# Patient Record
Sex: FEMALE | Race: WHITE | NOT HISPANIC OR LATINO
[De-identification: names, ages, dates, MRNs, and addresses within clinical notes are randomized per-mention and may not be internally consistent; named-entity substitution may affect disease eponyms.]

---

## 2020-12-01 PROBLEM — Z86.19 HISTORY OF HERPES SIMPLEX INFECTION: Status: RESOLVED | Noted: 2020-12-01 | Resolved: 2020-12-01

## 2020-12-01 PROBLEM — Z80.0 FAMILY HISTORY OF MALIGNANT NEOPLASM OF COLON: Status: ACTIVE | Noted: 2020-12-01

## 2020-12-01 PROBLEM — Z78.9 DOES NOT USE TOBACCO: Status: ACTIVE | Noted: 2020-12-01

## 2020-12-01 PROBLEM — Z87.59 HISTORY OF POSTPARTUM DEPRESSION: Status: RESOLVED | Noted: 2020-12-01 | Resolved: 2020-12-01

## 2020-12-01 PROBLEM — Z87.898 HISTORY OF MARIJUANA USE: Status: ACTIVE | Noted: 2020-12-01

## 2020-12-01 PROBLEM — Z63.5 DIVORCED: Status: ACTIVE | Noted: 2020-12-01

## 2020-12-01 RX ORDER — LAMOTRIGINE 25 MG/1
TABLET ORAL
Refills: 0 | Status: ACTIVE | COMMUNITY

## 2020-12-01 RX ORDER — VALACYCLOVIR HYDROCHLORIDE 1 G/1
TABLET, FILM COATED ORAL
Refills: 0 | Status: ACTIVE | COMMUNITY

## 2020-12-04 ENCOUNTER — APPOINTMENT (OUTPATIENT)
Dept: GYNECOLOGIC ONCOLOGY | Facility: CLINIC | Age: 45
End: 2020-12-04
Payer: COMMERCIAL

## 2020-12-04 VITALS
BODY MASS INDEX: 24.75 KG/M2 | RESPIRATION RATE: 16 BRPM | SYSTOLIC BLOOD PRESSURE: 101 MMHG | DIASTOLIC BLOOD PRESSURE: 67 MMHG | WEIGHT: 145 LBS | HEIGHT: 64 IN | OXYGEN SATURATION: 97 % | HEART RATE: 63 BPM

## 2020-12-04 DIAGNOSIS — Z87.59 PERSONAL HISTORY OF OTHER COMPLICATIONS OF PREGNANCY, CHILDBIRTH AND THE PUERPERIUM: ICD-10-CM

## 2020-12-04 DIAGNOSIS — Z80.0 FAMILY HISTORY OF MALIGNANT NEOPLASM OF DIGESTIVE ORGANS: ICD-10-CM

## 2020-12-04 DIAGNOSIS — Z78.9 OTHER SPECIFIED HEALTH STATUS: ICD-10-CM

## 2020-12-04 DIAGNOSIS — Z86.59 PERSONAL HISTORY OF OTHER COMPLICATIONS OF PREGNANCY, CHILDBIRTH AND THE PUERPERIUM: ICD-10-CM

## 2020-12-04 DIAGNOSIS — Z87.898 PERSONAL HISTORY OF OTHER SPECIFIED CONDITIONS: ICD-10-CM

## 2020-12-04 DIAGNOSIS — Z86.19 PERSONAL HISTORY OF OTHER INFECTIOUS AND PARASITIC DISEASES: ICD-10-CM

## 2020-12-04 DIAGNOSIS — Z63.5 DISRUPTION OF FAMILY BY SEPARATION AND DIVORCE: ICD-10-CM

## 2020-12-04 DIAGNOSIS — N89.8 OTHER SPECIFIED NONINFLAMMATORY DISORDERS OF VAGINA: ICD-10-CM

## 2020-12-04 PROCEDURE — 99072 ADDL SUPL MATRL&STAF TM PHE: CPT

## 2020-12-04 PROCEDURE — 99203 OFFICE O/P NEW LOW 30 MIN: CPT

## 2020-12-04 SDOH — SOCIAL STABILITY - SOCIAL INSECURITY: DISRUPTION OF FAMILY BY SEPARATION AND DIVORCE: Z63.5

## 2020-12-08 PROBLEM — N89.8 VAGINAL LESION: Status: ACTIVE | Noted: 2020-12-01

## 2020-12-09 NOTE — END OF VISIT
[FreeTextEntry3] : Written by Jolene Davison PA-C, acting as a scribe for Dr. Chi Osei.\par This note accurately reflects the work and decisions made by me.\par

## 2020-12-09 NOTE — PHYSICAL EXAM
[Normal] : Bimanual Exam: Normal [de-identified] : Patient was seen and examined with chaperone Jolene Davison PA-C.

## 2020-12-09 NOTE — CHIEF COMPLAINT
[FreeTextEntry1] : [facility]\par [place] [facility]\par [address]\[place] [address]\par

## 2020-12-09 NOTE — ASSESSMENT
[FreeTextEntry1] : 45 year old with concern for vaginal lump/lesion. Discussed with patient there is no visualization of an abnormality on her exam today. We discussed that she is likely feeling the posterior lip of her cervix but that everything appears WNL on examination. She will follow up with her primary GYN for continued care.

## 2023-04-24 ENCOUNTER — APPOINTMENT (OUTPATIENT)
Dept: OBGYN | Facility: CLINIC | Age: 48
End: 2023-04-24
Payer: COMMERCIAL

## 2023-04-24 VITALS — WEIGHT: 140 LBS | BODY MASS INDEX: 23.32 KG/M2 | HEIGHT: 65 IN

## 2023-04-24 DIAGNOSIS — Z00.00 ENCOUNTER FOR GENERAL ADULT MEDICAL EXAMINATION W/OUT ABNORMAL FINDINGS: ICD-10-CM

## 2023-04-24 DIAGNOSIS — N39.3 STRESS INCONTINENCE (FEMALE) (MALE): ICD-10-CM

## 2023-04-24 DIAGNOSIS — F32.A DEPRESSION, UNSPECIFIED: ICD-10-CM

## 2023-04-24 DIAGNOSIS — R10.2 PELVIC AND PERINEAL PAIN: ICD-10-CM

## 2023-04-24 DIAGNOSIS — N94.10 UNSPECIFIED DYSPAREUNIA: ICD-10-CM

## 2023-04-24 DIAGNOSIS — N81.4 UTEROVAGINAL PROLAPSE, UNSPECIFIED: ICD-10-CM

## 2023-04-24 DIAGNOSIS — N81.6 RECTOCELE: ICD-10-CM

## 2023-04-24 DIAGNOSIS — Z71.9 COUNSELING, UNSPECIFIED: ICD-10-CM

## 2023-04-24 LAB
HCG UR QL: NEGATIVE
QUALITY CONTROL: YES

## 2023-04-24 PROCEDURE — 99205 OFFICE O/P NEW HI 60 MIN: CPT

## 2023-04-26 PROBLEM — N81.6 RECTOCELE, FEMALE: Status: ACTIVE | Noted: 2023-04-26

## 2023-04-26 PROBLEM — Z71.9 ENCOUNTER FOR CONSULTATION: Status: ACTIVE | Noted: 2023-04-24

## 2023-04-26 PROBLEM — R10.2 PELVIC PAIN IN FEMALE: Status: ACTIVE | Noted: 2023-04-26

## 2023-04-26 PROBLEM — N39.3 STRESS INCONTINENCE OF URINE: Status: ACTIVE | Noted: 2023-04-26

## 2023-04-26 PROBLEM — F32.A DEPRESSION: Status: ACTIVE | Noted: 2023-04-24

## 2023-04-26 PROBLEM — N94.10 DYSPAREUNIA IN FEMALE: Status: ACTIVE | Noted: 2023-04-24

## 2023-04-26 PROBLEM — N81.4 CYSTOCELE WITH PROLAPSE: Status: ACTIVE | Noted: 2023-04-26

## 2023-04-26 NOTE — CONSULT LETTER
[Dear  ___] : Dear  [unfilled], [FreeTextEntry1] : I had the pleasure of evaluating your patient, LAKSHMI CHAVEZ. Please see my note enclosed for full details.\par \par Thank you for allowing me to participate in the care of this patient. If you have any questions, please do not hesitate to contact me.\par \par Sincerely,\par \par Huong Rosen MD, FACOG \par [facility]\par Obstetrics and Gynecology in [place]\par [address] [place] [address]\[place] [address]\par Phone: [phone] Fax: [phone]

## 2023-04-26 NOTE — REASON FOR VISIT
[Initial] : an initial consultation for [FreeTextEntry2] : surgery  [FreeTextEntry1] : Jenaro perez

## 2023-04-26 NOTE — DISCUSSION/SUMMARY
[FreeTextEntry1] : 49 yo with pelvic pain exacerbated by intercourse, dyspareunia, hx fibroids. Pt with stress incontinence and exam notable for cystocele and rectocele. \par 1) Pelvic pain, dyspareunia: \par -Discussed possible causes including post tubal ablative syndrome, fibroids (degeneration), pelvic floor dysfunction, pelvic congestion syndrome, less likely endometriosis. Discussed options for management including NSAID, trial of hormonal options, pelvic floor PT, IR embolization,  and surgical options including diagnostic laparoscopy and hysterectomy. She desires hysterectomy. Based on her exam, she is a candidate for minimally invasive approach. We discussed the risks and benefits of removal vs retention of cervix, removal vs retention of fallopian tubes, and removal vs retention of ovaries. Tentative plan for hysterectomy/bilateral salpingectomy/cystoscopy. \par \par 2) Stress incontinence, cystocele/rectocele: \par -Referral to Urogyn for evaluation\par -Consideration of concomitant surgery to address

## 2023-04-26 NOTE — PHYSICAL EXAM
[Chaperone Present] : A chaperone was present in the examining room during all aspects of the physical examination [FreeTextEntry1] : ABELINO Jose  [Appropriately responsive] : appropriately responsive [Alert] : alert [No Acute Distress] : no acute distress [No Lymphadenopathy] : no lymphadenopathy [Soft] : soft [Non-tender] : non-tender [Non-distended] : non-distended [No HSM] : No HSM [No Lesions] : no lesions [No Mass] : no mass [Oriented x3] : oriented x3 [Labia Majora] : normal [Labia Minora] : normal [Cystocele] : a cystocele [Rectocele] : a rectocele [Uterine Adnexae] : normal [Normal] : normal [FreeTextEntry6] : 10 week globular mobile uterus with palpable left anterior fibroid in lower uterine segment

## 2023-06-06 ENCOUNTER — RESULT CHARGE (OUTPATIENT)
Age: 48
End: 2023-06-06

## 2023-06-06 ENCOUNTER — APPOINTMENT (OUTPATIENT)
Dept: UROGYNECOLOGY | Facility: CLINIC | Age: 48
End: 2023-06-06
Payer: COMMERCIAL

## 2023-06-06 VITALS
WEIGHT: 143 LBS | HEIGHT: 65 IN | BODY MASS INDEX: 23.82 KG/M2 | SYSTOLIC BLOOD PRESSURE: 122 MMHG | DIASTOLIC BLOOD PRESSURE: 82 MMHG

## 2023-06-06 DIAGNOSIS — Z86.39 PERSONAL HISTORY OF OTHER ENDOCRINE, NUTRITIONAL AND METABOLIC DISEASE: ICD-10-CM

## 2023-06-06 DIAGNOSIS — M54.50 LOW BACK PAIN, UNSPECIFIED: ICD-10-CM

## 2023-06-06 DIAGNOSIS — R35.1 NOCTURIA: ICD-10-CM

## 2023-06-06 DIAGNOSIS — R30.0 DYSURIA: ICD-10-CM

## 2023-06-06 DIAGNOSIS — R35.0 FREQUENCY OF MICTURITION: ICD-10-CM

## 2023-06-06 DIAGNOSIS — R39.15 URGENCY OF URINATION: ICD-10-CM

## 2023-06-06 DIAGNOSIS — Z87.828 PERSONAL HISTORY OF OTHER (HEALED) PHYSICAL INJURY AND TRAUMA: ICD-10-CM

## 2023-06-06 DIAGNOSIS — R32 UNSPECIFIED URINARY INCONTINENCE: ICD-10-CM

## 2023-06-06 DIAGNOSIS — Z86.59 PERSONAL HISTORY OF OTHER MENTAL AND BEHAVIORAL DISORDERS: ICD-10-CM

## 2023-06-06 LAB
BILIRUB UR QL STRIP: NEGATIVE
CLARITY UR: CLEAR
COLLECTION METHOD: NORMAL
GLUCOSE UR-MCNC: NEGATIVE
HCG UR QL: 0.2 EU/DL
HGB UR QL STRIP.AUTO: NEGATIVE
KETONES UR-MCNC: NEGATIVE
LEUKOCYTE ESTERASE UR QL STRIP: NEGATIVE
NITRITE UR QL STRIP: NEGATIVE
PH UR STRIP: 6
PROT UR STRIP-MCNC: NEGATIVE
SP GR UR STRIP: 1.02

## 2023-06-06 PROCEDURE — 81003 URINALYSIS AUTO W/O SCOPE: CPT | Mod: QW

## 2023-06-06 PROCEDURE — 51701 INSERT BLADDER CATHETER: CPT | Mod: 59

## 2023-06-06 PROCEDURE — 99214 OFFICE O/P EST MOD 30 MIN: CPT | Mod: 25

## 2023-06-06 PROCEDURE — 99204 OFFICE O/P NEW MOD 45 MIN: CPT | Mod: 25

## 2023-06-06 NOTE — PHYSICAL EXAM
[Chaperone Present] : A chaperone was present in the examining room during all aspects of the physical examination [No Acute Distress] : in no acute distress [Well developed] : well developed [Well Nourished] : ~L well nourished [Cough] : no cough [No Edema] : ~T edema was not present [Tenderness] : ~T no ~M abdominal tenderness observed [Distended] : not distended [None] : no CVA tenderness [Inguinal LAD] : no adenopathy was noted in the inguinal lymph nodes [Warm and Dry] : was warm and dry to touch [Normal Gait] : gait was normal [Labia Majora] : were normal [Labia Minora] : were normal [Normal Appearance] : general appearance was normal [Atrophy] : atrophy [2] : 2 [Absent] : absent [Normal] : no abnormalities [Post Void Residual ____ml] : post void residual was [unfilled] ml [Normal rectal exam] : was normal [de-identified] : neg CST [FreeTextEntry3] : neg CST [de-identified] : no POP

## 2023-06-06 NOTE — DISCUSSION/SUMMARY
[FreeTextEntry1] : \par Jennifer presents for eval of POP/CHASE. She does have pelvic pain, negative CST, normal PVR. \par \par 1. POP: Visual illustrations were used to demonstrate prolapse. We reviewed management options for her prolapse including: observation, pelvic floor exercises with and without physical therapy, pessary, and surgical management. We reviewed the different types of surgical procedures including abdominal, vaginal, and robotic/laparoscopic procedures. In addition we reviewed procedures that involve hysterectomy as well as surgeries with uterine preservation. Mesh and non-mesh options were reviewed. IUGA information on prolapse was given to her.\par \par \par 2. CHASE:   We discussed management options including observation, pelvic floor exercises with or without physical therapy, pessary, impressa, medications including imipramine and surgical management including urethral bulking agents, fascial sling, wagner and midurethral sling with mesh.\par \par IUGA SCP/MUS given\par \par [] MRI def\par [] plan for combined case with GYN- SCP/sling/cysto\par possible CRS pending MRI def

## 2023-06-06 NOTE — PROCEDURE
[Straight Catheterization] : insertion of a straight catheter [Urinary Frequency] : urinary frequency [Patient] : the patient [___ Fr Straight Tip] : a [unfilled] in [place] straight tip catheter [None] : there were no complications with the catheter insertion [Clear] : clear [No Complications] : no complications [Tolerated Well] : the patient tolerated the procedure well [Post procedure instructions and information given] : Post procedure instructions and information were given and reviewed with patient. [0] : 0

## 2023-06-06 NOTE — HISTORY OF PRESENT ILLNESS
[Cystocele (Obstetric)] : moderate [x2] : nocturia two times a night [Stool Visible Blood] : no [Diarrhea] : no [Incomplete Emptying Of Stool] : no [Sexual Dysfunction, NOS] : moderate [Uterine Prolapse] : mild [Vaginal Wall Prolapse] : no [Rectal Prolapse] : moderate [Unable To Restrain Bowel Movement] : no [Feelings Of Urinary Urgency] : no [Pain During Urination (Dysuria)] : no [] : years ago [de-identified] : Not currently sexually active [FreeTextEntry1] : Patient reports worsening pelvic pain, urinary and defecation issues. She reports difficultly with defecation. She states she needs to splint, but it is not always successful. She feels that stool gets stuck. She reports urinary frequency and leakage with cough, laugh, sneeze. She reports significant pelvic pressure and pain. All three symptoms interfere with her ability to do her job. She is not currently sexually active due to these symptoms and reports intercourse is painful\par \par  most recent PAP, neg HPV, negative PAP

## 2023-06-12 RX ORDER — ALPRAZOLAM 0.25 MG/1
0.25 TABLET ORAL
Qty: 2 | Refills: 0 | Status: ACTIVE | COMMUNITY
Start: 2023-06-12 | End: 1900-01-01

## 2023-06-22 ENCOUNTER — NON-APPOINTMENT (OUTPATIENT)
Age: 48
End: 2023-06-22

## 2023-06-23 ENCOUNTER — NON-APPOINTMENT (OUTPATIENT)
Age: 48
End: 2023-06-23

## 2023-07-07 ENCOUNTER — NON-APPOINTMENT (OUTPATIENT)
Age: 48
End: 2023-07-07

## 2023-07-17 ENCOUNTER — APPOINTMENT (OUTPATIENT)
Dept: UROGYNECOLOGY | Facility: CLINIC | Age: 48
End: 2023-07-17

## 2023-10-24 ENCOUNTER — APPOINTMENT (OUTPATIENT)
Dept: UROGYNECOLOGY | Facility: CLINIC | Age: 48
End: 2023-10-24
